# Patient Record
Sex: MALE | Race: WHITE | NOT HISPANIC OR LATINO | Employment: STUDENT | URBAN - METROPOLITAN AREA
[De-identification: names, ages, dates, MRNs, and addresses within clinical notes are randomized per-mention and may not be internally consistent; named-entity substitution may affect disease eponyms.]

---
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
## 2023-01-04 ENCOUNTER — OFFICE VISIT (OUTPATIENT)
Dept: PEDIATRIC CARDIOLOGY | Facility: CLINIC | Age: 18
End: 2023-01-04

## 2023-01-04 VITALS
HEART RATE: 63 BPM | HEIGHT: 68 IN | OXYGEN SATURATION: 98 % | BODY MASS INDEX: 19.13 KG/M2 | WEIGHT: 126.2 LBS | SYSTOLIC BLOOD PRESSURE: 105 MMHG | DIASTOLIC BLOOD PRESSURE: 75 MMHG

## 2023-01-04 DIAGNOSIS — I49.3 PREMATURE VENTRICULAR CONTRACTIONS: Primary | ICD-10-CM

## 2023-01-04 DIAGNOSIS — I49.3 PVC'S (PREMATURE VENTRICULAR CONTRACTIONS): Primary | ICD-10-CM

## 2023-01-04 NOTE — PROGRESS NOTES
2023    Referring provider: Guru Eisenberg*      Dear Nola Munson PA-C,    I had the pleasure of seeing your patient, Mason Nuñez, in the [facility] on 2023  As you know, he is a 21 y o  male who was seen today and accompanied by mom  HPI:   Nette Fuentes is a 38-year-old male who presents for follow-up of premature ventricular complexes  He was last seen in our office by Dr Luciano Naylor in 2021  He is maintained on Toprol 25 mg daily  He reports for the most part he remembers to take his medication  He does have intermittent awareness of palpitations but they are brief and not sustained  In the last few weeks, he had a few episodes of chest pain and maybe a more "forceful palpation"  lasting 1-2 seconds in duration, when going from a laying to standing position  This occurred at rest only  Never occurred amidst activity  No associated shortness of breath, dizziness, near-syncope or syncope  He is not exercising routinely  He discontinued nicotine but continues to vape and use recreational marijuana  He denies any interval changes to his medical history  PMH:  Birth history was unremarkable  No hospitalizations  No surgeries  FAMILY HISTORY:   3 male members on biological moms side  due to "heart problems" (2 were maternal uncles  in 45s and 46s  And Maternal grandfather in his 62s )  Question of substance abuse  One had an implanted cardiac device  Details not clear  There is no family history of congenital heart disease, hypertrophic cardiomyopathy, congenital deafness  SOCIAL HISTORY:   Working doing [employer]     Leaving for college in a few weeks - studying horticulture in [facility]:   Toprol 25 mg daily       Allergies   Allergen Reactions   • Other Allergic Rhinitis     Seasonal        Review of Systems   Constitutional: Negative for activity change, appetite change, chills, diaphoresis, fatigue, fever and unexpected weight change  HENT: Negative for nosebleeds  Respiratory: Negative for cough, chest tightness, shortness of breath, wheezing and stridor  Cardiovascular: Negative for chest pain, palpitations and leg swelling  Gastrointestinal: Negative for nausea and vomiting  Endocrine: Negative for cold intolerance and heat intolerance  Musculoskeletal: Negative for arthralgias, joint swelling and myalgias  Skin: Negative for color change, pallor and rash  Neurological: Negative for dizziness, syncope, speech difficulty, weakness, light-headedness, numbness and headaches  Hematological: Negative for adenopathy  Does not bruise/bleed easily  Psychiatric/Behavioral: Negative for behavioral problems  The patient is not nervous/anxious  PHYSICAL EXAMINATION:     Vitals:    01/04/23 0953   BP: 105/75   Pulse: 63   SpO2: 98%   Weight: 57 2 kg (126 lb 3 2 oz)   Height: 5' 7 5" (1 715 m)       General:  Well - developed well-nourished and in no acute distress; acyanotic and non- dysmorphic  HEENT: Exam is benign  PERRL, MMM  Lungs: non labored, no retractions, lungs clear to auscultation in all fields with no wheezes, rales or rhonchi  Cardiovascular:  Normal PMI  RRR  There is a normal first heart sound and the second heart sound is physiologically split  No murmurs are appreciated  There are no significant clicks,  rubs or gallops noted  Abdomen: soft, non-tender and non-distended with no organomegaly  Extremities: Warm and well perfused  Pulses are 2+ in upper and lower extremities with no disparity  There is  no brachiofemoral delay  There is no cyanosis, clubbing or edema  Skin: no rashes noted  Neuro: alert and appropriate    EKG:  EKG demonstrates a normal sinus rhythm with sinus arrhythmia with a short NH interval at a rate of 61 bpm   There was one PVC  The QTc was 448 msec      Echocardiogram:  •  Normal cardiac anatomy and function  Holter:  Ordered today    ASSESSMENT/PLAN:   Iris Harden is a 42-year-old male with a structurally normal heart and a history of short runs of nonsustained ventricular tachycardia and isolated PVCs  His symptoms are well controlled on low-dose Toprol 25 mg daily  We discussed utilizing a pillbox for compliance  His previous Holter revealed monomorphic premature ventricular complexes in singles:  11 3% in 11/2020, 10% in 8/2021  A follow-up Holter monitor was placed today  Will continue Toprol 25 mg for now as its well tolerated  We did perform a follow-up screening echocardiogram which continues to demonstrate a structurally normal heart with normal biventricular size and functioning  He had a reassuring stress test in 8/2020, with frequent PVCs at the beginning of the study, that were completely suppressed during higher heart rates  Given his family history of premature cardiac deaths and unknown details, we discussed the use of genetic testing  He is encouraged to eat a heart healthy diet, exercise regularly, and remain well hydrated  Discussed importance of avoiding nicotine, excessive caffeine, and recreational drugs  I will order a fasting lipid panel for cardiac completeness  SBE Prophylaxis is NOT required for this patient  Iris Harden should have a follow up visit  - pending Holter results/ and recommend transition to adult EP  Thank you for allowing me to [facility]  If I can be of assistance in any way please feel free to contact me through the office  Brice Palmer PA-C  Pediatric Cardiology  [email or web address]  [phone]    Portions of the record may have been created with voice recognition software  Occasional wrong word or "sound a like" substitutions may have occurred due to the inherent limitations of voice recognition software    Read the chart carefully and recognize, using context, where substitutions have occurred

## 2023-01-18 ENCOUNTER — CLINICAL SUPPORT (OUTPATIENT)
Dept: PEDIATRIC CARDIOLOGY | Facility: CLINIC | Age: 18
End: 2023-01-18

## 2023-01-18 DIAGNOSIS — I49.3 PREMATURE VENTRICULAR CONTRACTIONS: ICD-10-CM

## 2023-01-24 DIAGNOSIS — I49.3 PREMATURE VENTRICULAR CONTRACTIONS: ICD-10-CM

## 2023-01-24 RX ORDER — METOPROLOL SUCCINATE 25 MG/1
25 TABLET, EXTENDED RELEASE ORAL DAILY
Qty: 90 TABLET | Refills: 3 | Status: CANCELLED | OUTPATIENT
Start: 2023-01-24

## 2023-01-24 NOTE — TELEPHONE ENCOUNTER
Mom LM on refill request line   Patient is in need of metoprolol succinate  [facility]   Mom stating it is a 80 supply

## 2023-01-27 ENCOUNTER — TELEPHONE (OUTPATIENT)
Dept: PEDIATRIC CARDIOLOGY | Facility: CLINIC | Age: 18
End: 2023-01-27

## 2023-01-27 NOTE — TELEPHONE ENCOUNTER
Contacted patients pharmacy at the advice of Flynn Willard PA-C  A verbal order for  metoprolol succinate (TOPROL-XL) 25 mg 24 hr tablet [[id number]]     Take 1 tablet (25 mg total) by mouth daily  quantity 90  Refill one  Was called into patients pharmacy listed in patients chart  Spoke with Charu Guerrier the pharmacist     pharmacy to notify parent when medication is ready to be picked up

## 2023-03-14 ENCOUNTER — TELEPHONE (OUTPATIENT)
Dept: NEPHROLOGY | Facility: CLINIC | Age: 18
End: 2023-03-14

## 2023-03-14 NOTE — TELEPHONE ENCOUNTER
Mom called in stating she received a bill in the mail for genetic testing Tyshawn Oneill got done for cardiology and she has some questions  Asking for a call back [phone]

## 2023-03-14 NOTE — TELEPHONE ENCOUNTER
Contacted parent and advised to call [phone] [facility] to discuss billing matter  A copy of patients original [facility] requisition form  for genetic testing dated 1/4/23 was emailed to parent at parents request     Above emailed to [email or web address]    Parent to contact above for clarification

## 2023-04-24 DIAGNOSIS — I49.3 PREMATURE VENTRICULAR CONTRACTIONS: ICD-10-CM

## 2023-04-26 RX ORDER — METOPROLOL SUCCINATE 25 MG/1
TABLET, EXTENDED RELEASE ORAL
Qty: 90 TABLET | Refills: 1 | Status: SHIPPED | OUTPATIENT
Start: 2023-04-26

## 2023-05-11 NOTE — PROGRESS NOTES
Update received from Fatimah Rajan (Behavioral Specialist at [facility])  Fatimah Rajan spoke with patient's mom Megan regarding [facility] and alternate partial program at [facility]  At this time, Irving plans for patient to continue with [facility] and will self-pay as he had a good experience yesterday  Fatimah Rajan also encouraged Irving to discuss outpatient treatment services with the case management team at [facility] upon patient's discharge, as they should assist with this transition  Megan understanding  OP[facility] SW outreached Megan to follow-up as well  Discussed above information  Megan confirmed patient will remain at [facility] at this time and she will coordinate with the program case management team for discharge services  Megan to Ronaldo Mendoza or NAVARRO JULIAN if additional assistance is needed  Megan expressed appreciation for follow-up  OPCM SW to close case at this time but will remain available to assist with any future needs  Update routed to Jt Howard  Initial (On Arrival)

## 2023-06-13 DIAGNOSIS — B00.1 RECURRENT COLD SORES: ICD-10-CM

## 2023-06-13 RX ORDER — VALACYCLOVIR HYDROCHLORIDE 1 G/1
2000 TABLET, FILM COATED ORAL 2 TIMES DAILY
Qty: 12 TABLET | Refills: 1 | Status: SHIPPED | OUTPATIENT
Start: 2023-06-13 | End: 2023-06-16

## 2023-11-13 ENCOUNTER — TELEPHONE (OUTPATIENT)
Dept: PEDIATRIC CARDIOLOGY | Facility: CLINIC | Age: 18
End: 2023-11-13

## 2023-11-13 DIAGNOSIS — I49.3 PREMATURE VENTRICULAR CONTRACTIONS: Primary | ICD-10-CM

## 2023-11-13 DIAGNOSIS — I49.3 PREMATURE VENTRICULAR CONTRACTIONS: ICD-10-CM

## 2023-11-13 DIAGNOSIS — I47.20 VENTRICULAR TACHYCARDIA (HCC): ICD-10-CM

## 2023-11-13 RX ORDER — METOPROLOL SUCCINATE 25 MG/1
25 TABLET, EXTENDED RELEASE ORAL DAILY
Qty: 90 TABLET | Refills: 0 | Status: SHIPPED | OUTPATIENT
Start: 2023-11-13

## 2023-11-13 NOTE — TELEPHONE ENCOUNTER
Manuel Castro from patients PCP office calling stating patient is in need of a refill of medication. Patient was seen pediatric cardiology office 1/4/2023. Patient was instructed at that time he is to transfer to adult cardiology. Pcp office grateful for above information will inform patients parent.

## 2023-11-13 NOTE — TELEPHONE ENCOUNTER
Spoke with Adry Warren, the reason they haven't been refilling the medication is because they have not received any voicemail or messages from her requesting a refill. Also during the last visit they were told Marquis Oliver has to find a Cardiologist for adults and not peds.

## 2023-11-13 NOTE — TELEPHONE ENCOUNTER
John Paul Bishop called due to her son needing a medication refill. She has called Cardio since Oct 31 for refill. Has not gotten a call back nor a script sent. She was wondering if you could refill Metoprolol.

## 2024-01-08 ENCOUNTER — OFFICE VISIT (OUTPATIENT)
Dept: CARDIOLOGY CLINIC | Facility: CLINIC | Age: 19
End: 2024-01-08
Payer: COMMERCIAL

## 2024-01-08 VITALS
OXYGEN SATURATION: 98 % | HEART RATE: 57 BPM | DIASTOLIC BLOOD PRESSURE: 60 MMHG | WEIGHT: 121 LBS | SYSTOLIC BLOOD PRESSURE: 102 MMHG | BODY MASS INDEX: 18.67 KG/M2

## 2024-01-08 DIAGNOSIS — I49.3 PREMATURE VENTRICULAR CONTRACTIONS: ICD-10-CM

## 2024-01-08 DIAGNOSIS — I47.20 VENTRICULAR TACHYCARDIA (HCC): Primary | ICD-10-CM

## 2024-01-08 DIAGNOSIS — Z13.220 SCREENING FOR LIPID DISORDERS: ICD-10-CM

## 2024-01-08 PROCEDURE — 93000 ELECTROCARDIOGRAM COMPLETE: CPT | Performed by: INTERNAL MEDICINE

## 2024-01-08 PROCEDURE — 99204 OFFICE O/P NEW MOD 45 MIN: CPT | Performed by: INTERNAL MEDICINE

## 2024-01-08 RX ORDER — METOPROLOL SUCCINATE 25 MG/1
25 TABLET, EXTENDED RELEASE ORAL DAILY
Qty: 90 TABLET | Refills: 3 | Status: SHIPPED | OUTPATIENT
Start: 2024-01-08

## 2024-01-08 NOTE — PROGRESS NOTES
Cardiology     Clinic Visit Note  Sanford Nixon 18 y.o. male   MRN: [id number]    Assessment and Plan      Diagnoses and all orders for this visit:    Ventricular tachycardia (HCC) - Previous history of NSVT. Has been on metoprolol for many years and repeat zio patches have not demonstrated NSVT. He does have a moderate PVC burden but most recently less than 10%. His last echocardiogram had normal EF and no significant structural abnormalities. 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 He will cont. Metoprolol at this time. Would limit substances that can be possible nidus including nicotine and THC.   -     POCT ECG  -     MRI cardiac  w wo contrast; Future    2. Premature ventricular contractions - As above.   -     POCT ECG  -     MRI cardiac  w wo contrast; Future  -     metoprolol succinate (TOPROL-XL) 25 mg 24 hr tablet; Take 1 tablet (25 mg total) by mouth daily    3. Screening for lipid disorders  -     Lipid Panel With Direct LDL; Future    Schedule a follow-up appointment in 3 months.     Chief Complaint: PVCs/NSVT.   Subjective     History of Present Illness:  18 year old male with PMH of PVC and VT. He previously saw Dr. Reyes with pediatric cardiology. He has been started on metoprolol for his PVC's. He last had an echocardiogram performed January 2023 which was notable for normal LVEF and no significant valvular abnormalities. His last zio patch was Jan 2023 and he was noted to have 8.7% pvc burden. No NSVT at that time. He had a stress test 8/2020 that was normal other than PVC which were suppressed during stage 4 of exercise. His NSVT was noted in 2020 which he had a run of 6 beats.     He reports that he does feel lightheaded at times - but not too frequently and seems to happen at random times and is more like a dissociative feeling. He does not endorse any chest pain. He reports he does not drink much alcohol. He does smoke  marijuana and previously has been a heavy smoker. He has a mild amount of caffeine intake (less than 100 mg daily). He reports shortness of breath only when he smokes marijuana - otherwise he does not endorse any heart failure symptoms. No leg swelling. No orthopnea. Occasional rare palpitations - but does not seem to be overtly symptomatic from his PVC's he reports.     Family history - significant for grandparents with myocardial infarctions in the 50s. No single car accidents - drownings - no SCD at a young age.     Previous Cardiology Workup:  TREADMILL STRESS  Results for orders placed during the hospital encounter of 20    Stress test only, exercise    Narrative  [place] [id number] [phone]    Stress Electrocardiography during Exercise    Name: STELLA REINOSO  MR #: [id number]  Account #: [id number]  Study date: 2020  : 2005  Age: 15 years  Gender: Male  Height: 67 in  Weight: 134 lb  BSA: 1.71 mï¾²    Allergies: NO KNOWN ALLERGIES    Diagnosis: 785.1 - PALPITATIONS    Primary Physician:  Yulissa SORENSON  Referring Physician:  DEBBIE CHENG  Group:  [facility]  Cardiology Fellow:  Xena Villegas MD  Report Prepared By::  Raven Palmer  Interpreting Physician:  Wiley Gloria MD  Technician:  Raven Palmer    CLINICAL QUESTION: Ventricular arrhythmia.    HISTORY: The patient is a 15 year old  male. Chest pain status: no chest pain. Other symptoms: palpitations. Coronary artery disease risk factors: smoking usage of E-cig daily and family history of premature coronary artery  disease. Medications: a beta blocker.    REST ECG: Normal sinus rhythm. The ECG showed frequent monomorphic premature ventricular contractions possibly originating from the right ventricular outflow tract.    PROCEDURE: Treadmill exercise testing was performed, using the Raji protocol. Stress  electrocardiographic evaluation was performed.    ELIZ PROTOCOL:  HR bpm SBP mmHg DBP mmHg Symptoms  Baseline 55 104 72 none  Stage 1 83 102 68 none  Stage 2 100 110 70 none  Stage 3 126 132 72 fatigue  Stage 4 150 -- -- mild dyspnea, fatigue  Recovery 1 150 148 70 same as above  Recovery 2 75 112 72 subsiding  Recovery 3 65 116 68 none  resting o2 sat 98% peak stress o2 sat 99% No medications or fluids given.    STRESS SUMMARY: Duration of exercise was 12 min and 0 sec. The patient exercised to protocol stage 4. Functional capacity was normal. Maximal heart rate during stress was 150 bpm ( 74 % of maximal predicted heart rate). The heart rate  response to stress was blunted likely due to beta-blocker therapy. There was normal resting blood pressure with an appropriate response to stress. The rate-pressure product for the peak heart rate and blood pressure was 37699. There was no  chest pain during stress. The stress test was terminated due to achievement of maximal (symptom limited) exercise and protocol completion. Arrhythmia during stress: isolated premature ventricular beats.  PVCs were monomorphic with similar morphology to that of baseline EKG with some fusion beats. PVCs were completely suppressed during stage 4 of the exercise protocol.    SUMMARY:  -  Stress results: Duration of exercise was 12 min and 0 sec. Functional capacity was normal. Target heart rate was not achieved. There was no chest pain during stress.  -  Impressions and recommendations: Patient had frequent PVCs at the beginning of the test which were suppressed during the stage 4 of the exercise.    IMPRESSIONS: Patient had frequent PVCs at the beginning of the test which were suppressed during the stage 4 of the exercise. Equivocal study after submaximal exercise. Diagnostic sensitivity was limited by submaximal stress.    Prepared and signed by    Wiley Gloria MD  Signed 08/31/2020 15:35:43      ----------------------------------------------------------------------------------------------  NUCLEAR STRESS TEST: No results found for this or any previous visit.    No results found for this or any previous visit.      --------------------------------------------------------------------------------  CATH:  No results found for this or any previous visit.    --------------------------------------------------------------------------------  ECHO:   No results found for this or any previous visit.    No results found for this or any previous visit.    --------------------------------------------------------------------------------  HOLTER  No results found for this or any previous visit.    --------------------------------------------------------------------------------  CAROTIDS  No results found for this or any previous visit.       ---------------------------------------------------------------------------------  Review of Systems   Constitutional:  Negative for activity change.   Cardiovascular:  Positive for palpitations. Negative for chest pain and leg swelling.   Skin:  Negative for rash.   Neurological:  Negative for syncope.   Psychiatric/Behavioral:  Negative for agitation.          Current Outpatient Medications:     metoprolol succinate (TOPROL-XL) 25 mg 24 hr tablet, Take 1 tablet (25 mg total) by mouth daily, Disp: 90 tablet, Rfl: 0    valACYclovir (VALTREX) 1,000 mg tablet, Take 2 tablets (2,000 mg total) by mouth 2 (two) times a day for 3 days, Disp: 12 tablet, Rfl: 1  Past Medical History:   Diagnosis Date    Anxiety     Depression     PVC's (premature ventricular contractions)      No past surgical history on file.  Social History     Socioeconomic History    Marital status: Single     Spouse name: Not on file    Number of children: Not on file    Years of education: Not on file    Highest education level: Not on file   Occupational History    Not on file   Tobacco Use    Smoking status: Never     Smokeless tobacco: Current    Tobacco comments:     Vaping   Vaping Use    Vaping status: Every Day   Substance and Sexual Activity    Alcohol use: Yes     Comment: Rarely    Drug use: Yes     Types: Marijuana    Sexual activity: Yes     Partners: Female     Birth control/protection: Condom Male   Other Topics Concern    Not on file   Social History Narrative    Not on file     Social Determinants of Health     Financial Resource Strain: Not on file   Food Insecurity: Not on file   Transportation Needs: Not on file   Physical Activity: Not on file   Stress: Not on file   Social Connections: Not on file   Intimate Partner Violence: Not on file   Housing Stability: Not on file     Family History   Problem Relation Age of Onset    Alcohol abuse Mother     Substance Abuse Mother     No Known Problems Father     Alcohol abuse Maternal Aunt     Substance Abuse Maternal Aunt     Alcohol abuse Maternal Uncle     Substance Abuse Maternal Uncle     Alcohol abuse Cousin     Substance Abuse Cousin     Mental illness Neg Hx      Allergies   Allergen Reactions    Other Allergic Rhinitis     Seasonal        Objective     Vitals:    01/08/24 0903   BP: 102/60   BP Location: Right arm   Patient Position: Sitting   Cuff Size: Standard   Pulse: 57   SpO2: 98%   Weight: 54.9 kg (121 lb)       Physical exam:     Physical Exam  Constitutional:       Appearance: Normal appearance.   Cardiovascular:      Rate and Rhythm: Normal rate. Rhythm irregular.   Pulmonary:      Effort: Pulmonary effort is normal. No respiratory distress.      Breath sounds: Normal breath sounds.   Abdominal:      General: There is no distension.   Musculoskeletal:      Right lower leg: No edema.      Left lower leg: No edema.   Neurological:      Mental Status: He is alert.           ==  PLEASE NOTE:  This encounter was completed utilizing the [employer]/Fluency Direct Speech Voice Recognition Software. Grammatical errors, random word insertions, pronoun errors and  incomplete sentences are occasional consequences of the system due to software limitations, ambient noise and hardware issues.These may be missed by proof reading prior to affixing electronic signature. Any questions or concerns about the content, text or information contained within the body of this dictation should be directly addressed to the physician for clarification. Please do not hesitate to call me directly if you have any any questions or concerns.

## 2024-03-14 ENCOUNTER — HOSPITAL ENCOUNTER (OUTPATIENT)
Dept: MRI IMAGING | Facility: HOSPITAL | Age: 19
Discharge: HOME | End: 2024-03-14
Payer: COMMERCIAL

## 2024-03-14 DIAGNOSIS — I47.20 VENTRICULAR TACHYCARDIA (HCC): ICD-10-CM

## 2024-03-14 DIAGNOSIS — I49.3 PREMATURE VENTRICULAR CONTRACTIONS: ICD-10-CM

## 2024-03-14 PROCEDURE — 75561 CARDIAC MRI FOR MORPH W/DYE: CPT

## 2024-03-14 PROCEDURE — G1004 CDSM NDSC: HCPCS

## 2024-03-14 PROCEDURE — A9585 GADOBUTROL INJECTION: HCPCS | Performed by: INTERNAL MEDICINE

## 2024-03-14 RX ORDER — GADOBUTROL 604.72 MG/ML
10 INJECTION INTRAVENOUS
Status: COMPLETED | OUTPATIENT
Start: 2024-03-14 | End: 2024-03-14

## 2024-03-14 RX ADMIN — GADOBUTROL 10 ML: 604.72 INJECTION INTRAVENOUS at 12:52

## 2024-03-26 ENCOUNTER — TELEPHONE (OUTPATIENT)
Dept: CARDIOLOGY CLINIC | Facility: CLINIC | Age: 19
End: 2024-03-26

## 2024-03-26 NOTE — TELEPHONE ENCOUNTER
Melinda called to go over and get results of Sanford's cardiac MRI, Mom is documented on the hIPAA form.     Review results with Mom per Dr Hernandez, DO  Normal cardiac MRI. Mom verbally understood

## 2024-06-13 ENCOUNTER — OFFICE VISIT (OUTPATIENT)
Dept: FAMILY MEDICINE CLINIC | Facility: CLINIC | Age: 19
End: 2024-06-13
Payer: COMMERCIAL

## 2024-06-13 VITALS
WEIGHT: 113 LBS | BODY MASS INDEX: 17.13 KG/M2 | SYSTOLIC BLOOD PRESSURE: 102 MMHG | HEART RATE: 68 BPM | HEIGHT: 68 IN | TEMPERATURE: 98.4 F | DIASTOLIC BLOOD PRESSURE: 76 MMHG | OXYGEN SATURATION: 98 % | RESPIRATION RATE: 16 BRPM

## 2024-06-13 DIAGNOSIS — E46 PROTEIN-CALORIE MALNUTRITION, UNSPECIFIED SEVERITY (HCC): ICD-10-CM

## 2024-06-13 DIAGNOSIS — F33.9 DEPRESSION, RECURRENT (HCC): Primary | ICD-10-CM

## 2024-06-13 PROCEDURE — 99213 OFFICE O/P EST LOW 20 MIN: CPT | Performed by: PHYSICIAN ASSISTANT

## 2024-06-13 NOTE — PROGRESS NOTES
Assessment/Plan:    MDD - refer to psych, continue with therapist, medicinal marijuana  Low BMI - encourage healthy lifestyle    F/u as needed    Subjective:   Chief Complaint   Patient presents with    Insomnia     Difficulty falling and staying asleep  Ongoing issue    Loss of Appetite     Ongoing issue  No n/v abdominal pain  Inconsistent eating patterns      Patient ID: Sanford Nixon is a 19 y.o. male.    Patient here complaining of trouble sleeping, not eating well.  Feels this has been long term problems, mother told him to come today to discuss medications. Patient follows with therapist and feels they have had some productive conversations about patient being on the spectrum possibly. Woul dlike to pursue this.    Currently only using medical marijuana. Refusing to take zoloft again. Does not want to put anything in his body.          The following portions of the patient's history were reviewed and updated as appropriate: allergies, current medications, past family history, past medical history, past social history, past surgical history, and problem list.    Past Medical History:   Diagnosis Date    Anxiety     Depression     PVC's (premature ventricular contractions)      History reviewed. No pertinent surgical history.  Family History   Problem Relation Age of Onset    Alcohol abuse Mother     Substance Abuse Mother     No Known Problems Father     Alcohol abuse Maternal Aunt     Substance Abuse Maternal Aunt     Alcohol abuse Maternal Uncle     Substance Abuse Maternal Uncle     Alcohol abuse Cousin     Substance Abuse Cousin     Mental illness Neg Hx      Social History     Socioeconomic History    Marital status: Single     Spouse name: Not on file    Number of children: Not on file    Years of education: Not on file    Highest education level: Not on file   Occupational History    Not on file   Tobacco Use    Smoking status: Never    Smokeless tobacco: Current    Tobacco comments:     Vaping  "  Vaping Use    Vaping status: Every Day   Substance and Sexual Activity    Alcohol use: Yes     Comment: Rarely    Drug use: Yes     Types: Marijuana    Sexual activity: Yes     Partners: Female     Birth control/protection: Condom Male   Other Topics Concern    Not on file   Social History Narrative    Not on file     Social Determinants of Health     Financial Resource Strain: Not on file   Food Insecurity: Not on file   Transportation Needs: Not on file   Physical Activity: Not on file   Stress: Not on file   Social Connections: Not on file   Intimate Partner Violence: Not on file   Housing Stability: Not on file       Current Outpatient Medications:     metoprolol succinate (TOPROL-XL) 25 mg 24 hr tablet, Take 1 tablet (25 mg total) by mouth daily, Disp: 90 tablet, Rfl: 3    valACYclovir (VALTREX) 1,000 mg tablet, Take 2 tablets (2,000 mg total) by mouth 2 (two) times a day for 3 days, Disp: 12 tablet, Rfl: 1    Review of Systems          Objective:    Vitals:    06/13/24 1341   BP: 102/76   Pulse: 68   Resp: 16   Temp: 98.4 °F (36.9 °C)   TempSrc: Temporal   SpO2: 98%   Weight: 51.3 kg (113 lb)   Height: 5' 8\" (1.727 m)        Physical Exam  Constitutional:       Appearance: Normal appearance. He is normal weight.   HENT:      Head: Normocephalic and atraumatic.   Neurological:      General: No focal deficit present.      Mental Status: He is alert and oriented to person, place, and time.   Psychiatric:         Mood and Affect: Mood normal.         Behavior: Behavior normal.         Thought Content: Thought content normal.         Judgment: Judgment normal.           Depression Screening Follow-up Plan: Patient's depression screening was positive with a PHQ-2 score of . Their PHQ-9 score was 17. Patient assessed for underlying major depression. They have no active suicidal ideations. Brief counseling provided and recommend additional follow-up/re-evaluation next office visit.    "

## 2024-06-25 ENCOUNTER — TELEPHONE (OUTPATIENT)
Age: 19
End: 2024-06-25

## 2024-06-25 DIAGNOSIS — G47.00 INSOMNIA, UNSPECIFIED TYPE: Primary | ICD-10-CM

## 2024-06-25 LAB
25(OH)D3 SERPL-MCNC: 30 NG/ML (ref 30–100)
ALBUMIN SERPL-MCNC: 4.9 G/DL (ref 3.6–5.1)
ALBUMIN/GLOB SERPL: 2.6 (CALC) (ref 1–2.5)
ALP SERPL-CCNC: 57 U/L (ref 36–130)
ALT SERPL-CCNC: 14 U/L (ref 9–46)
AST SERPL-CCNC: 15 U/L (ref 10–40)
BASOPHILS # BLD AUTO: 48 CELLS/UL (ref 0–200)
BASOPHILS NFR BLD AUTO: 1 %
BILIRUB SERPL-MCNC: 0.5 MG/DL (ref 0.2–1.2)
BUN SERPL-MCNC: 11 MG/DL (ref 7–25)
BUN/CREAT SERPL: ABNORMAL (CALC) (ref 6–22)
CALCIUM SERPL-MCNC: 9.7 MG/DL (ref 8.6–10.3)
CHLORIDE SERPL-SCNC: 105 MMOL/L (ref 98–110)
CO2 SERPL-SCNC: 27 MMOL/L (ref 20–32)
CREAT SERPL-MCNC: 0.89 MG/DL (ref 0.6–1.24)
EOSINOPHIL # BLD AUTO: 91 CELLS/UL (ref 15–500)
EOSINOPHIL NFR BLD AUTO: 1.9 %
ERYTHROCYTE [DISTWIDTH] IN BLOOD BY AUTOMATED COUNT: 13.5 % (ref 11–15)
GFR/BSA.PRED SERPLBLD CYS-BASED-ARV: 125 ML/MIN/1.73M2
GLOBULIN SER CALC-MCNC: 1.9 G/DL (CALC) (ref 1.9–3.7)
GLUCOSE SERPL-MCNC: 90 MG/DL (ref 65–99)
HCT VFR BLD AUTO: 42.7 % (ref 38.5–50)
HGB BLD-MCNC: 14.2 G/DL (ref 13.2–17.1)
LYMPHOCYTES # BLD AUTO: 1920 CELLS/UL (ref 850–3900)
LYMPHOCYTES NFR BLD AUTO: 40 %
MCH RBC QN AUTO: 27 PG (ref 27–33)
MCHC RBC AUTO-ENTMCNC: 33.3 G/DL (ref 32–36)
MCV RBC AUTO: 81.3 FL (ref 80–100)
MONOCYTES # BLD AUTO: 269 CELLS/UL (ref 200–950)
MONOCYTES NFR BLD AUTO: 5.6 %
NEUTROPHILS # BLD AUTO: 2472 CELLS/UL (ref 1500–7800)
NEUTROPHILS NFR BLD AUTO: 51.5 %
PLATELET # BLD AUTO: 254 THOUSAND/UL (ref 140–400)
PMV BLD REES-ECKER: 11.7 FL (ref 7.5–12.5)
POTASSIUM SERPL-SCNC: 4.2 MMOL/L (ref 3.5–5.3)
PROT SERPL-MCNC: 6.8 G/DL (ref 6.1–8.1)
RBC # BLD AUTO: 5.25 MILLION/UL (ref 4.2–5.8)
SODIUM SERPL-SCNC: 140 MMOL/L (ref 135–146)
TSH SERPL-ACNC: 0.77 MIU/L (ref 0.4–4.5)
WBC # BLD AUTO: 4.8 THOUSAND/UL (ref 3.8–10.8)

## 2024-06-25 NOTE — TELEPHONE ENCOUNTER
Pt called in stating that  he received a msg that, per Yulissa, he should cancel his appt today and make an appt for a sleep study. He is requesting a sleep study order be placed in his chart. I gave him the number for central scheduling as well.    Please advise pt once order has been placed.    Thanks

## 2024-06-26 ENCOUNTER — OFFICE VISIT (OUTPATIENT)
Dept: SLEEP CENTER | Facility: CLINIC | Age: 19
End: 2024-06-26
Payer: COMMERCIAL

## 2024-06-26 ENCOUNTER — TELEPHONE (OUTPATIENT)
Age: 19
End: 2024-06-26

## 2024-06-26 VITALS
HEIGHT: 68 IN | DIASTOLIC BLOOD PRESSURE: 62 MMHG | WEIGHT: 111 LBS | BODY MASS INDEX: 16.82 KG/M2 | SYSTOLIC BLOOD PRESSURE: 98 MMHG | OXYGEN SATURATION: 97 % | HEART RATE: 48 BPM

## 2024-06-26 DIAGNOSIS — Z79.899 MEDICAL MARIJUANA USE: ICD-10-CM

## 2024-06-26 DIAGNOSIS — R63.4 WEIGHT LOSS, NON-INTENTIONAL: ICD-10-CM

## 2024-06-26 DIAGNOSIS — G47.00 INSOMNIA, UNSPECIFIED TYPE: Primary | ICD-10-CM

## 2024-06-26 DIAGNOSIS — I49.3 PREMATURE VENTRICULAR CONTRACTIONS: ICD-10-CM

## 2024-06-26 DIAGNOSIS — F32.A ANXIETY AND DEPRESSION: ICD-10-CM

## 2024-06-26 DIAGNOSIS — E46 PROTEIN-CALORIE MALNUTRITION, UNSPECIFIED SEVERITY (HCC): ICD-10-CM

## 2024-06-26 DIAGNOSIS — F41.9 ANXIETY AND DEPRESSION: ICD-10-CM

## 2024-06-26 PROCEDURE — 99244 OFF/OP CNSLTJ NEW/EST MOD 40: CPT | Performed by: INTERNAL MEDICINE

## 2024-06-26 NOTE — TELEPHONE ENCOUNTER
Patient is requesting an insurance referral for the following specialty:      Test Name / Order Name: Consult     DX Code: G47.00    Date Of Service: 06/26/2024    Location/Facility Name/Address/Phone #:   [facility] [place], [address] [place] [address], [address], [place], [place], [address]     Location / Facility NPI: [id number]    [facility] Phone # To Reach The Patient: [phone]    [facility] [phone]

## 2024-06-26 NOTE — PATIENT INSTRUCTIONS
What you can do to improve your sleep: (Sleep Hygiene) Basic rules for a good night's sleep  Create a regular sleep schedule. This will help you form a sleep routine. Keep a record of your sleep patterns, and any sleeping problems you have. Bring the record to follow-up visits with healthcare providers.  Avoid prolonged use of light-emitting screens before bedtime or watching TV in bed.  Avoid forcing sleep.  Do not take naps. Naps could make it hard for you to fall asleep at bedtime.  Deal with your worries before bedtime.  Keep your bedroom cool, quiet, and dark. Turn on white noise, such as a fan, to help you relax. Do not use your bed for any activity that will keep you awake. Do not read, exercise, eat, or watch TV in your bedroom.  Get up if you do not fall asleep within 20 minutes. Move to another room and do something relaxing until you become sleepy.  Limit caffeine, alcohol, nicotine and food to earlier in the day. Only drink caffeine in the morning. Do not drink alcohol within 6 hours of bedtime. Do not eat a heavy meal right before you go to bed. Avoid smoking, especially in the evening.  Exercise regularly. Daily exercise will help you sleep better. Do not exercise within 4 hours of bedtime.  Stimulus control therapy rules  1. Go to bed only when sleepy.  2. Do not watch television, read, eat, or worry while in bed. Use bed only for sleep and sex.  3. Get out of bed if unable to fall asleep within 20 minutes and go to another room. Return to bed only when sleepy. Repeat this step as many times as necessary throughout the night.  4. Set an alarm clock to wake up at a fixed time each morning, including weekends.  5. Do not take a nap during the day.  Data from: Olga RR, Shaylee ML. Nonpharmacologic treatments of insomnia. J Clin Psychiatry 1992; 53:37.  Go to AASM website for more information: Sleepeducation.org  Recommended Reading: Book by delano Worley   No More sleepless nights      Nursing  Support:  When: Monday through Friday 7A-5PM except holidays  Where: Our direct line is [phone].    If you are having a true emergency please call 911.  In the event that the line is busy or it is after hours please leave a voice message and we will return your call.  Please speak clearly, leaving your full name, birth date, best number to reach you and the reason for your call.   Medication refills: We will need the name of the medication, the dosage, the ordering provider, whether you get a 30 or 90 day refill, and the pharmacy name and address.  Medications will be ordered by the provider only.  Nurses cannot call in prescriptions.  Please allow 7 days for medication refills.  Physician requested updates: If your provider requested that you call with an update after starting medication, please be ready to provide us the medication and dosage, what time you take your medication, the time you attempt to fall asleep, time you fall asleep, when you wake up, and what time you get out of bed.  Sleep Study Results: We will contact you with sleep study results and/or next steps after the physician has reviewed your testing.

## 2024-06-26 NOTE — PROGRESS NOTES
Consultation - Sleep Center   Sanford Nixon  19 y.o. male  :2005  MRN:[id number]  DOS:2024    Physician Requesting Consult: Yulissa Sarah P*             Reason for Consult : At your kind request I saw Sanford Nixon for initial sleep evaluation today. Patient is here for a complaint of insomnia  Patient's presents with:.    PFSH, Problem List, Medications & Allergies were reviewed in EMR.    Sanford  has a past medical history of Anxiety, Depression, and PVC's (premature ventricular contractions).      He has a current medication list which includes the following prescription(s): metoprolol succinate and valacyclovir.      HPI: Has been experiencing sleep difficulties for several years and is now here because symptoms have gotten worse.  Sanford sleeps alone and is is un aware of snoring or breathing difficulties during sleep  Other complaints: None. Restless Leg Syndrome: reports no suggestive symptoms.  .  Parasomnia: no features reported    Sleep Routine (averaged): Typical Bedtime: 1 to 4 AM   Gets OOB: 10 to 11 AM. TIB:>9 hrs.   Sleep latency: several hours because of racing thoughts.  He is on his phone when in bed.; Sleep Interruptions: 1-, not sure of the cause, but is anxious and struggles to fall back asleep. Awakens: Spontaneously  Upon awakening: never feels rested.  He estimates getting 5-6hrs sleep.  Daytime Function:Sanford reports excessive daytime sleepiness feels like napping but avoids. He rated himself at Total score: 12 /24 on the [place] Sleepiness Scale.     Habits:   reports that he has never smoked. He uses smokeless tobacco.;  reports current alcohol use.; Reports current drug use. Drug: Marijuana.;  E-Cigarette/Vaping  E-Cigarette Use Current Every Day User  Comments Vape; Caffeine use:limited; Exercise routine: none.    Occupation:     Family History: Negative for sleep disturbance.  ROS: Significant for recent weight loss because of poor  "appetite..  He reports some nasal congestion.  He reported no cardiac symptoms.  He has episodic palpitations.  He reports feelings of anxiety and depression    EXAM:  BP 98/62   Pulse (!) 48   Ht 5' 8\" (1.727 m)   Wt 50.3 kg (111 lb)   SpO2 97%   BMI 16.88 kg/m²    General: Well groomed male, well appearing, in no apparent distress.   Neurological: Alert and orientated; cooperative; Cranial nerves intact;    Psychiatric: Speech: Clear and coherent; normal mood, affect & thought   Skin: Warm and dry; Color& Hydration good; no facial rashes or lesions   HEENT:  Craniofacial anatomy: narrow facies Sinuses: Non-tender. TMJ: Normal    Eyes: EOM's intact; conjunctiva/corneas clear   Ears: Appear normal     Nasal Airway: is patent Septum: Intact; Turbinates: Normal; Rhinorrhea: None  Mouth: Lips: Normal posture; Dentition: normal . Mucosa: Moist; Hard Palate:normal    Oropharryx: crowdedTongue: Mallampati:Class IV and MobileSoft Palate:  redundant  and Uvular Hypertrophy Tonsils: absent  Neck:; Neck Circumference: 12.5 \"; supple; no abnormal masses; Thyroid: Normal. Trachea: Central.    Lymph: No cervical Lymhadenopathy  Heart: S1,S2 normal; RRR; no gallop; no murmur   Lungs: Respiratory Effort: Normal. Air entry good bilaterally.  No wheezes.  No rales  Abdomen:  Soft & non-tender    Extremities: No pedal edema.  No clubbing or cyanosis.    Musculoskeletal:  Motor normal; Gait: Normal.       IMPRESSION: Primary/Secondary Sleep Diagnoses (to Medical or Psych conditions) & Comorbidities   1. Insomnia, unspecified type  Ambulatory Referral to Sleep Medicine      2. Anxiety and depression        3. Medical marijuana use        4. Premature ventricular contractions        5. Weight loss, non-intentional        6. Protein-calorie malnutrition, unspecified severity (HCC)             PLAN:   1. With respect to above conditions, comprehensive counseling provided on pathophysiology; effects on symptoms and comorbidities, " "diagnostic strategies & limitations; treatment options; risks or no treament; risks & benefits of the various therapeutic options; costs and insurance aspects.     2. Multi component Cognitive behavioral therapy for Insomnia undertaken - Sleep Restriction, Stimulus control, Relaxation techniques and Sleep hygiene were discussed.    3. Misconceptions, irrational and erroneous notions regarding insomnia were addressed.  4. Use of hypnotic medications was not recommended at this time because most effective and enduring results are with behavioral therapies.   5. I provided educational materials and advised reading \"No more sleepless nights \" by Authors Saul & Tanisha. In addition,   6.  If symptoms persist he should consider antidepressant medication and contact PCP or psychiatrist in this regard.    7.   He may also benefit from formal CBT-I  8. Nocturnal polysomnography is not indicated at this time and a diagnostic study can be considered once sleep has consolidated and if symptoms persist.   9. Follow-up advised in 2 to 3 months to monitor progress and to adjust therapy.  He is due to return to college in [place] and scheduled his follow-up for November when he next returns.    Sincerely,      Authenticated electronically on 06/26/24   Board Certified Specialist     Portions of the record may have been created with voice recognition software. Occasional wrong word or \"sound a like\" substitutions may have occurred due to the inherent limitations of voice recognition software. There may also be notations and random deletions of words or characters from malfunctioning software. Read the chart carefully and recognize, using context, where substitutions/deletions have occurred.     "

## 2024-07-02 NOTE — TELEPHONE ENCOUNTER
Dori from [facility] called to follow up on insurance referral for 6/26 consult.  [facility]# [id number]    Diag Code # G47.00  Please contact Dori for update on referral status.  [phone].

## 2024-09-23 ENCOUNTER — OFFICE VISIT (OUTPATIENT)
Dept: FAMILY MEDICINE CLINIC | Facility: CLINIC | Age: 19
End: 2024-09-23
Payer: COMMERCIAL

## 2024-09-23 ENCOUNTER — APPOINTMENT (OUTPATIENT)
Dept: RADIOLOGY | Facility: CLINIC | Age: 19
End: 2024-09-23
Payer: COMMERCIAL

## 2024-09-23 VITALS
OXYGEN SATURATION: 100 % | WEIGHT: 127.6 LBS | RESPIRATION RATE: 15 BRPM | HEART RATE: 54 BPM | DIASTOLIC BLOOD PRESSURE: 70 MMHG | BODY MASS INDEX: 19.34 KG/M2 | HEIGHT: 68 IN | SYSTOLIC BLOOD PRESSURE: 102 MMHG | TEMPERATURE: 98.2 F

## 2024-09-23 DIAGNOSIS — R39.9 LOWER URINARY TRACT SYMPTOMS: ICD-10-CM

## 2024-09-23 DIAGNOSIS — R39.9 LOWER URINARY TRACT SYMPTOMS: Primary | ICD-10-CM

## 2024-09-23 DIAGNOSIS — Z11.3 SCREEN FOR STD (SEXUALLY TRANSMITTED DISEASE): ICD-10-CM

## 2024-09-23 PROCEDURE — 74018 RADEX ABDOMEN 1 VIEW: CPT

## 2024-09-23 PROCEDURE — 99213 OFFICE O/P EST LOW 20 MIN: CPT | Performed by: PHYSICIAN ASSISTANT
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
## 2024-09-24 LAB
APPEARANCE UR: CLEAR
BILIRUB UR QL STRIP: NEGATIVE
C TRACH RRNA SPEC QL NAA+PROBE: NOT DETECTED
COLOR UR: YELLOW
GLUCOSE UR QL STRIP: NEGATIVE
HGB UR QL STRIP: NEGATIVE
KETONES UR QL STRIP: NEGATIVE
LEUKOCYTE ESTERASE UR QL STRIP: NEGATIVE
N GONORRHOEA RRNA SPEC QL NAA+PROBE: NOT DETECTED
NITRITE UR QL STRIP: NEGATIVE
PH UR STRIP: 6.5 [PH] (ref 5–8)
PROT UR QL STRIP: NEGATIVE
SP GR UR STRIP: 1 (ref 1–1.03)

## 2024-10-04 ENCOUNTER — TELEPHONE (OUTPATIENT)
Age: 19
End: 2024-10-04

## 2024-11-03 ENCOUNTER — OFFICE VISIT (OUTPATIENT)
Dept: URGENT CARE | Facility: CLINIC | Age: 19
End: 2024-11-03
Payer: COMMERCIAL

## 2024-11-03 VITALS
HEART RATE: 57 BPM | RESPIRATION RATE: 18 BRPM | DIASTOLIC BLOOD PRESSURE: 52 MMHG | HEIGHT: 68 IN | WEIGHT: 133 LBS | BODY MASS INDEX: 20.16 KG/M2 | OXYGEN SATURATION: 97 % | SYSTOLIC BLOOD PRESSURE: 107 MMHG | TEMPERATURE: 98.3 F

## 2024-11-03 DIAGNOSIS — J02.9 SORE THROAT: ICD-10-CM

## 2024-11-03 DIAGNOSIS — J02.9 ACUTE PHARYNGITIS, UNSPECIFIED ETIOLOGY: Primary | ICD-10-CM

## 2024-11-03 LAB — S PYO AG THROAT QL: NEGATIVE

## 2024-11-03 PROCEDURE — 87880 STREP A ASSAY W/OPTIC: CPT

## 2024-11-03 PROCEDURE — G0382 LEV 3 HOSP TYPE B ED VISIT: HCPCS

## 2024-11-03 PROCEDURE — S9083 URGENT CARE CENTER GLOBAL: HCPCS

## 2024-11-03 RX ORDER — PREDNISONE 10 MG/1
TABLET ORAL
Qty: 15 TABLET | Refills: 0 | Status: SHIPPED | OUTPATIENT
Start: 2024-11-03 | End: 2024-11-06

## 2024-11-03 NOTE — PROGRESS NOTES
[facility]        NAME: Sanford Nixon is a 19 y.o. male  : 2005    MRN: [id number]  DATE: November 3, 2024  TIME: 2:07 PM    Assessment and Plan   Acute pharyngitis, unspecified etiology [J02.9]  1. Acute pharyngitis, unspecified etiology  predniSONE 10 mg tablet      2. Sore throat  POCT rapid ANTIGEN strepA    Throat culture            Patient Instructions     Your rapid strep test was negative. No antibiotics are needed at this time.     Throat swab will be sent for definitive culture. You can download [facility] for the results which take approximately 48-72 hours. You will be notified if positive.     Your symptoms are consistent with a viral illness.    For nasal/sinus congestion you can try steam, warm compresses, saline nasal spray, Neti pot, nasal steroid (Flonase, Nasocort), or nasal decongestant (Afrin - for 3 days only).    You can try a decongestant (Sudafed) if > 6 years of age and no history of high blood pressure.    For cough you can take an over-the-counter expectorant such as plain Robitussion or Mucinex. A spoonful of honey at bedtime may also be helpful.    For sore throat you can use Cepacol lozenges, do warm salt water gargles, drink warm water with lemon or herbal teas, or use an over-the-counter throat spray (Chloraseptic).    You can take ibuprofen/Motrin and acetaminophen/Tylenol as needed for pain, fever, body aches. Do not take ibuprofen/Motrin/Advil if you have a history of heart disease, bleeding ulcers, or if you take blood thinners.     Drink plenty of fluids to stay hydrated. Airborne or Emergen-C for extra vitamin C and zinc.    Follow up with your PCP in 3-5 days for persistent symptoms.    Go to the ER if symptoms worsen.     If tests are performed, our office will contact you with results only if changes need to made to the care plan discussed with you at the visit. You can review your full results on [facility].      Chief Complaint      Chief Complaint   Patient presents with    Sore Throat     Pt states he's had a sore throat for 3 days pt took zycam, musinex  and airbone         History of Present Illness       Patient states he went to a concert three days ago and woke up the next day with a headache and sore throat.  Started taking Airborne and Zicam.  Gargling with salt water.  Feels like it is getting worse.  Also with nasal congestion, runny nose, ear discomfort, and a mild dry cough.  No fevers/chills or GI symptoms.  Requesting note for school.        Review of Systems   Review of Systems   Constitutional:  Negative for chills and fever.   HENT:  Positive for congestion, rhinorrhea and sore throat. Negative for ear pain and sinus pressure.    Eyes:  Negative for discharge and redness.   Respiratory:  Positive for cough. Negative for shortness of breath and wheezing.    Cardiovascular:  Negative for chest pain.   Gastrointestinal:  Negative for abdominal pain, diarrhea, nausea and vomiting.   Skin:  Negative for pallor and rash.   Neurological:  Positive for headaches. Negative for dizziness and light-headedness.         Current Medications       Current Outpatient Medications:     predniSONE 10 mg tablet, Take 5 tablets (50 mg total) by mouth daily for 1 day, THEN 4 tablets (40 mg total) daily for 1 day, THEN 3 tablets (30 mg total) daily for 1 day, THEN 2 tablets (20 mg total) daily for 1 day, THEN 1 tablet (10 mg total) daily for 1 day., Disp: 15 tablet, Rfl: 0    metoprolol succinate (TOPROL-XL) 25 mg 24 hr tablet, Take 1 tablet (25 mg total) by mouth daily, Disp: 90 tablet, Rfl: 3    valACYclovir (VALTREX) 1,000 mg tablet, Take 2 tablets (2,000 mg total) by mouth 2 (two) times a day for 3 days, Disp: 12 tablet, Rfl: 1    Current Allergies     Allergies as of 11/03/2024 - Reviewed 11/03/2024   Allergen Reaction Noted    Other Allergic Rhinitis 05/23/2022            The following portions of the patient's history were reviewed and  "updated as appropriate: allergies, current medications, past family history, past medical history, past social history, past surgical history and problem list.     Past Medical History:   Diagnosis Date    Anxiety     Depression     PVC's (premature ventricular contractions)        History reviewed. No pertinent surgical history.    Family History   Problem Relation Age of Onset    Alcohol abuse Mother     Substance Abuse Mother     No Known Problems Father     Alcohol abuse Maternal Aunt     Substance Abuse Maternal Aunt     Alcohol abuse Maternal Uncle     Substance Abuse Maternal Uncle     Alcohol abuse Cousin     Substance Abuse Cousin     Mental illness Neg Hx          Medications have been verified.        Objective   /52   Pulse 57   Temp 98.3 °F (36.8 °C)   Resp 18   Ht 5' 8\" (1.727 m)   Wt 60.3 kg (133 lb)   SpO2 97%   BMI 20.22 kg/m²        Physical Exam     Physical Exam  Vitals and nursing note reviewed.   Constitutional:       General: He is not in acute distress.     Appearance: He is not ill-appearing, toxic-appearing or diaphoretic.   HENT:      Head: Normocephalic and atraumatic.      Right Ear: Tympanic membrane, ear canal and external ear normal.      Left Ear: Tympanic membrane, ear canal and external ear normal.      Nose: Congestion present.      Mouth/Throat:      Mouth: Mucous membranes are moist.      Pharynx: Oropharynx is clear. Posterior oropharyngeal erythema present. No oropharyngeal exudate.   Eyes:      Conjunctiva/sclera: Conjunctivae normal.      Pupils: Pupils are equal, round, and reactive to light.   Cardiovascular:      Rate and Rhythm: Normal rate and regular rhythm.      Pulses: Normal pulses.      Heart sounds: Normal heart sounds.   Pulmonary:      Effort: Pulmonary effort is normal.      Breath sounds: Normal breath sounds.   Musculoskeletal:         General: Normal range of motion.      Cervical back: Normal range of motion and neck supple.   Lymphadenopathy: "      Cervical: Cervical adenopathy present.   Skin:     General: Skin is warm and dry.      Capillary Refill: Capillary refill takes less than 2 seconds.   Neurological:      Mental Status: He is alert and oriented to person, place, and time.

## 2024-11-03 NOTE — PATIENT INSTRUCTIONS
Your rapid strep test was negative. No antibiotics are needed at this time.     Throat swab will be sent for definitive culture. [facility] which take approximately 48-72 hours. You will be notified if positive.     Your symptoms are consistent with a viral illness.    For nasal/sinus congestion you can try steam, warm compresses, saline nasal spray, Neti pot, nasal steroid (Flonase, Nasocort), or nasal decongestant (Afrin - for 3 days only).    You can try a decongestant (Sudafed) if > 6 years of age and no history of high blood pressure.    For cough you can take an over-the-counter expectorant such as plain Robitussion or Mucinex. A spoonful of honey at bedtime may also be helpful.    For sore throat you can use Cepacol lozenges, do warm salt water gargles, drink warm water with lemon or herbal teas, or use an over-the-counter throat spray (Chloraseptic).    You can take ibuprofen/Motrin and acetaminophen/Tylenol as needed for pain, fever, body aches. Do not take ibuprofen/Motrin/Advil if you have a history of heart disease, bleeding ulcers, or if you take blood thinners.     Drink plenty of fluids to stay hydrated. Airborne or Emergen-C for extra vitamin C and zinc.    Follow up with your PCP in 3-5 days for persistent symptoms.    Go to the ER if symptoms worsen.

## 2024-11-03 NOTE — LETTER
November 3, 2024     Patient: Sanford Nixon   YOB: 2005   Date of Visit: 11/3/2024       To Whom it May Concern:    Sanford Nixon was seen in my clinic on 11/3/2024. Please allow for extra time with voice recorded assignments due to medical illness.    If you have any questions or concerns, please don't hesitate to call.         Sincerely,          MINAL Gimenez        CC: No Recipients

## 2024-11-06 ENCOUNTER — OFFICE VISIT (OUTPATIENT)
Dept: FAMILY MEDICINE CLINIC | Facility: CLINIC | Age: 19
End: 2024-11-06
Payer: COMMERCIAL

## 2024-11-06 VITALS
HEART RATE: 57 BPM | DIASTOLIC BLOOD PRESSURE: 80 MMHG | OXYGEN SATURATION: 97 % | TEMPERATURE: 98 F | SYSTOLIC BLOOD PRESSURE: 122 MMHG | HEIGHT: 68 IN | BODY MASS INDEX: 20 KG/M2 | RESPIRATION RATE: 16 BRPM | WEIGHT: 132 LBS

## 2024-11-06 DIAGNOSIS — J01.00 ACUTE NON-RECURRENT MAXILLARY SINUSITIS: Primary | ICD-10-CM

## 2024-11-06 PROCEDURE — 99213 OFFICE O/P EST LOW 20 MIN: CPT | Performed by: PHYSICIAN ASSISTANT

## 2024-11-06 RX ORDER — PREDNISONE 20 MG/1
40 TABLET ORAL DAILY
Qty: 10 TABLET | Refills: 0 | Status: SHIPPED | OUTPATIENT
Start: 2024-11-06 | End: 2024-11-11

## 2024-11-06 RX ORDER — CEFUROXIME AXETIL 500 MG/1
500 TABLET ORAL EVERY 12 HOURS SCHEDULED
Qty: 20 TABLET | Refills: 0 | Status: SHIPPED | OUTPATIENT
Start: 2024-11-06 | End: 2024-11-16

## 2024-11-06 NOTE — PROGRESS NOTES
Assessment/Plan:    Sinusitis - ceftin 1 tab twice daily for 10 days, prednisone 40 mg once daily for 5 days, fluids, rest    F/u sa needed      Subjective:   Chief Complaint   Patient presents with    Sore Throat     Pt c/o sore throat, cough, congestion  Symptoms started about a week ago and not improving   Seen in urgent care on 11/3 and negative for strep and covid  Prescribed prednisone and not getting better  Pt had hx of mono      Patient ID: Sanford Nixon is a 19 y.o. male.    Patient went to a concert  10/28 and the next day woke up with a sore throat and congestion. Trouble sleeping because of congestion, a lot of mucus from nose, minimal mucus. Coughing mixed dry and productive, dry at night. Trouble sleeping last night. Denies fever, chills.         The following portions of the patient's history were reviewed and updated as appropriate: allergies, current medications, past family history, past medical history, past social history, past surgical history, and problem list.    Past Medical History:   Diagnosis Date    Anxiety     Depression     PVC's (premature ventricular contractions)      History reviewed. No pertinent surgical history.  Family History   Problem Relation Age of Onset    Alcohol abuse Mother     Substance Abuse Mother     No Known Problems Father     Alcohol abuse Maternal Aunt     Substance Abuse Maternal Aunt     Alcohol abuse Maternal Uncle     Substance Abuse Maternal Uncle     Alcohol abuse Cousin     Substance Abuse Cousin     Mental illness Neg Hx      Social History     Socioeconomic History    Marital status: Single     Spouse name: Not on file    Number of children: Not on file    Years of education: Not on file    Highest education level: Not on file   Occupational History    Not on file   Tobacco Use    Smoking status: Never    Smokeless tobacco: Current    Tobacco comments:     Vaping   Vaping Use    Vaping status: Every Day    Substances: Nicotine, Flavoring  "  Substance and Sexual Activity    Alcohol use: Not Currently     Comment: Rarely    Drug use: Yes     Types: Marijuana     Comment: medical card    Sexual activity: Yes     Partners: Female     Birth control/protection: Condom Male   Other Topics Concern    Not on file   Social History Narrative    Not on file     Social Determinants of Health     Financial Resource Strain: Not on file   Food Insecurity: Not on file   Transportation Needs: Not on file   Physical Activity: Not on file   Stress: Not on file   Social Connections: Not on file   Intimate Partner Violence: Not on file   Housing Stability: Not on file       Current Outpatient Medications:     metoprolol succinate (TOPROL-XL) 25 mg 24 hr tablet, Take 1 tablet (25 mg total) by mouth daily, Disp: 90 tablet, Rfl: 3    predniSONE 10 mg tablet, Take 5 tablets (50 mg total) by mouth daily for 1 day, THEN 4 tablets (40 mg total) daily for 1 day, THEN 3 tablets (30 mg total) daily for 1 day, THEN 2 tablets (20 mg total) daily for 1 day, THEN 1 tablet (10 mg total) daily for 1 day., Disp: 15 tablet, Rfl: 0    valACYclovir (VALTREX) 1,000 mg tablet, Take 2 tablets (2,000 mg total) by mouth 2 (two) times a day for 3 days, Disp: 12 tablet, Rfl: 1    Review of Systems          Objective:    Vitals:    11/06/24 1341   BP: 122/80   Pulse: 57   Resp: 16   Temp: 98 °F (36.7 °C)   TempSrc: Temporal   SpO2: 97%   Weight: 59.9 kg (132 lb)   Height: 5' 8\" (1.727 m)        Physical Exam  Constitutional:       General: He is not in acute distress.     Appearance: Normal appearance. He is well-developed. He is not toxic-appearing.   HENT:      Head: Normocephalic and atraumatic.      Right Ear: Tympanic membrane, ear canal and external ear normal. No middle ear effusion. Tympanic membrane is not erythematous.      Left Ear: Ear canal and external ear normal. A middle ear effusion is present. Tympanic membrane is not erythematous.      Nose: Mucosal edema, congestion and " rhinorrhea present.      Mouth/Throat:      Mouth: Mucous membranes are moist.      Pharynx: Oropharynx is clear. Posterior oropharyngeal erythema present. No oropharyngeal exudate.      Tonsils: Tonsillar exudate present. 1+ on the right. 1+ on the left.   Eyes:      Extraocular Movements: Extraocular movements intact.      Conjunctiva/sclera: Conjunctivae normal.   Cardiovascular:      Rate and Rhythm: Normal rate and regular rhythm.      Pulses: Normal pulses.      Heart sounds: Normal heart sounds.   Pulmonary:      Effort: Pulmonary effort is normal.      Breath sounds: Normal breath sounds.   Musculoskeletal:         General: Normal range of motion.      Cervical back: Normal range of motion and neck supple. No rigidity or tenderness.   Lymphadenopathy:      Cervical: No cervical adenopathy.   Skin:     General: Skin is warm.   Neurological:      General: No focal deficit present.      Mental Status: He is alert and oriented to person, place, and time.   Psychiatric:         Mood and Affect: Mood normal.         Behavior: Behavior normal.         Thought Content: Thought content normal.         Judgment: Judgment normal.

## 2024-11-06 NOTE — LETTER
November 6, 2024     Patient: Sanford Nixon  YOB: 2005  Date of Visit: 11/6/2024      To Whom it May Concern:    Sanford Nixon is under my professional care. Sanford was seen in my office on 11/6/2024. Sanford may return to school on 11/11/2024 .    If you have any questions or concerns, please don't hesitate to call.         Sincerely,          Yulissa Sarah PA-C        CC: No Recipients

## 2024-11-15 ENCOUNTER — TELEPHONE (OUTPATIENT)
Age: 19
End: 2024-11-15

## 2024-11-15 NOTE — TELEPHONE ENCOUNTER
Patient is requesting an insurance referral for the following specialty:      Test Name / Order Name: follow up    DX Code: G47.00    Date Of Service: 11/26/24    Location/Facility Name/Address/Phone #:   [facility] [place],   [address] [place] [address],   [place], [place], [address]    [phone]     Location / Facility NPI: [id number]    Best Phone # To Reach The Patient:  [phone]    AMB referral is already in chart.

## 2024-12-23 ENCOUNTER — TELEPHONE (OUTPATIENT)
Age: 19
End: 2024-12-23

## 2024-12-30 NOTE — TELEPHONE ENCOUNTER
"Behavioral Health Outpatient Intake Questions    Referred By   : Referral     Please advise interviewee that they need to answer all questions truthfully to allow for best care, and any misrepresentations of information may affect their ability to be seen at this clinic   => Was this discussed? Yes     If Minor Child (under age 18)    Who is/are the legal guardian(s) of the child?     Is there a custody agreement? No     If \"YES\"- Custody orders must be obtained prior to scheduling the first appointment  In addition, Consent to Treatment must be signed by all legal guardians prior to scheduling the first appointment    If \"NO\"- Consent to Treatment must be signed by all legal guardians prior to scheduling the first appointment    Behavioral Health Outpatient Intake History -     Presenting Problem (in patient's own words):     Pt stated that he is dealing with a lot of anxiety and depression.     Are there any communication barriers for this patient?     Yes                                               If yes, please describe barriers: ADHD  If there is a unique situation, please refer to Perry Spear/Kaylee Gardner for final determination.    Are you taking any psychiatric medications? No     If \"YES\" -What are they  NONE       If \"YES\" -Who prescribes?     Has the Patient previously received outpatient Talk Therapy or Medication Management from [facility]  No        If \"YES\"- When, Where and with Whom?         If \"NO\" -Has Patient received these services elsewhere?       If \"YES\" -When, Where, and with Whom?    Has the Patient abused alcohol or other substances in the last 6 months ? No  No concerns of substance abuse are reported.     If \"YES\" -What substance, How much, How often?     If illegal substance: Refer to [facility] (for ALISE) or [facility].   If Alcohol in excess of 10 drinks per week:  Refer to [facility] (for ALISE) or [facility]    Legal History-     Is this treatment court ordered? " "No   If \"yes \"send to :  Talk Therapy : Send to Perry Spear for final determination   Med Management: Send to Dr. Carlton for final determination     Has the Patient been convicted of a felony?  No   If \"Yes\" send to -When, What?  Talk Therapy: Send to Perry Spear for final determination   Med Management: Send to Dr. Carlton for final determination     ACCEPTED as a patient Yes  If \"Yes\" Appointment Date: 1/29/2025    Referred Elsewhere? No  If “Yes” - (Where? Ex: [facility], [facility], [facility], [facility], etc.)       Name of Insurance Co:[employer]  Insurance ID#[id number]  Insurance Phone #  If ins is primary or secondary?Primary   If patient is a minor, parents information such as Name, D.O.B of guarantor.  "

## 2025-01-02 DIAGNOSIS — I49.3 PREMATURE VENTRICULAR CONTRACTIONS: ICD-10-CM

## 2025-01-02 NOTE — TELEPHONE ENCOUNTER
Reason for call:   [x] Refill   [] Prior Auth  [] Other:     Office:   [] PCP/Provider -   [x] Specialty/Provider - Cardiology     Medication: Metoprolol Succinate     Dose/Frequency: 25 mg 24 hr tablet taken by mouth once daily     Quantity: 90    Pharmacy: [facility]/pharmacy #[id number] - [place] - [address] [address] [phone]     Does the patient have enough for 3 days?   [] Yes   [x] No - Send as HP to POD

## 2025-01-03 RX ORDER — METOPROLOL SUCCINATE 25 MG/1
25 TABLET, EXTENDED RELEASE ORAL DAILY
Qty: 30 TABLET | Refills: 0 | OUTPATIENT
Start: 2025-01-03

## 2025-01-06 ENCOUNTER — TELEPHONE (OUTPATIENT)
Dept: PSYCHIATRY | Facility: CLINIC | Age: 20
End: 2025-01-06

## 2025-01-06 NOTE — TELEPHONE ENCOUNTER
One week follow up call for New Patient appointment with Gertrude Sales PA-C  on 01/29/2025 was made on 01/06/2025. Writer informed patient of New Patient paperwork needing to be completed 5 days prior to the appointment. Writer confirmed paperwork has been sent via Kite.    Appointment was made on: 12/30/2024

## 2025-01-13 NOTE — TELEPHONE ENCOUNTER
(active communication consent on file)  [phone]  Please call mom for scheduling     Pt needs refill before heading back to school

## 2025-01-24 ENCOUNTER — OFFICE VISIT (OUTPATIENT)
Dept: CARDIOLOGY CLINIC | Facility: CLINIC | Age: 20
End: 2025-01-24
Payer: COMMERCIAL

## 2025-01-24 VITALS
SYSTOLIC BLOOD PRESSURE: 110 MMHG | BODY MASS INDEX: 19.02 KG/M2 | HEART RATE: 56 BPM | OXYGEN SATURATION: 97 % | HEIGHT: 68 IN | DIASTOLIC BLOOD PRESSURE: 74 MMHG | WEIGHT: 125.5 LBS

## 2025-01-24 DIAGNOSIS — I47.20 VENTRICULAR TACHYCARDIA (HCC): ICD-10-CM

## 2025-01-24 DIAGNOSIS — I49.3 PVC'S (PREMATURE VENTRICULAR CONTRACTIONS): ICD-10-CM

## 2025-01-24 DIAGNOSIS — I49.3 PREMATURE VENTRICULAR CONTRACTIONS: Primary | ICD-10-CM

## 2025-01-24 PROCEDURE — 99214 OFFICE O/P EST MOD 30 MIN: CPT

## 2025-01-24 RX ORDER — METOPROLOL SUCCINATE 25 MG/1
25 TABLET, EXTENDED RELEASE ORAL DAILY
Qty: 90 TABLET | Refills: 3 | Status: SHIPPED | OUTPATIENT
Start: 2025-01-24

## 2025-01-24 NOTE — PATIENT INSTRUCTIONS
Go to ER with any sustained elevated heart rates, shortness of breath or chest pain     Toprol from pharmacy
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
## 2025-01-29 ENCOUNTER — TELEMEDICINE (OUTPATIENT)
Dept: PSYCHIATRY | Facility: CLINIC | Age: 20
End: 2025-01-29
Payer: COMMERCIAL

## 2025-01-29 DIAGNOSIS — F41.1 GENERALIZED ANXIETY DISORDER: ICD-10-CM

## 2025-01-29 DIAGNOSIS — F33.1 MAJOR DEPRESSIVE DISORDER, RECURRENT, MODERATE (HCC): Primary | ICD-10-CM

## 2025-01-29 DIAGNOSIS — Z79.899 MEDICAL MARIJUANA USE: ICD-10-CM

## 2025-01-29 DIAGNOSIS — F90.2 ADHD (ATTENTION DEFICIT HYPERACTIVITY DISORDER), COMBINED TYPE: ICD-10-CM

## 2025-01-29 PROBLEM — F33.9 DEPRESSION, RECURRENT (HCC): Status: RESOLVED | Noted: 2019-03-19 | Resolved: 2025-01-29

## 2025-01-29 PROCEDURE — 90792 PSYCH DIAG EVAL W/MED SRVCS: CPT

## 2025-01-29 RX ORDER — ESCITALOPRAM OXALATE 10 MG/1
10 TABLET ORAL DAILY
Qty: 30 TABLET | Refills: 1 | Status: SHIPPED | OUTPATIENT
Start: 2025-01-29

## 2025-01-29 RX ORDER — ESCITALOPRAM OXALATE 5 MG/1
5 TABLET ORAL DAILY
Qty: 7 TABLET | Refills: 0 | Status: SHIPPED | OUTPATIENT
Start: 2025-01-29

## 2025-01-29 NOTE — ASSESSMENT & PLAN NOTE
Variable   Start Lexapro 5 mg daily for 1 week, then increase to 10 mg daily to help with anxiety and depressive symptoms   Recommended outpatient therapy, will place patient on the wait list     Depression Follow-up Plan Completed: Yes    Orders:    escitalopram (LEXAPRO) 5 mg tablet; Take 1 tablet (5 mg total) by mouth daily    escitalopram (Lexapro) 10 mg tablet; Take 1 tablet (10 mg total) by mouth daily

## 2025-01-29 NOTE — BH TREATMENT PLAN
TREATMENT PLAN (Medication Management Only)        [facility]    Name and Date of Birth:  Sanford Nixon 19 y.o. 2005  Date of Treatment Plan: January 29, 2025  Diagnosis/Diagnoses:    1. Major depressive disorder, recurrent, moderate (HCC)    2. Generalized anxiety disorder    3. ADHD (attention deficit hyperactivity disorder), combined type      Strengths/Personal Resources for Self-Care: supportive family, supportive friends, taking medications as prescribed, ability to adapt to life changes.  Area/Areas of need (in own words): anxiety, depression  1. Long Term Goal: improve mood, maintain an acceptable level of anxiety   Target Date:6 months - 7/29/2025  Person/Persons responsible for completion of goal: Sanford  2.  Short Term Objective (s) - How will we reach this goal?:   A. Provider new recommended medication/dosage changes and/or continue medication(s): continue current medications as prescribed.  B. Attend medication management appointments regularly.  C. N/A.  Target Date:6 months - 7/29/2025  Person/Persons Responsible for Completion of Goal: Sanford  Progress Towards Goals: starting treatment  Treatment Modality: medication management every 1 months  Review due 180 days from date of this plan: 6 months - 7/29/2025  Expected length of service: ongoing treatment  My Physician/PA/NP and I have developed this plan together and I agree to work on the goals and objectives. I understand the treatment goals that were developed for my treatment.

## 2025-01-29 NOTE — BH CRISIS PLAN
Client Name: Bharat Nixon       Client YOB: 2005    WilyJosh Safety Plan      Creation Date: 1/29/25 Update Date: 1/29/26   Created By: Gertrude Sales PA-C Last Updated By: Gertrude Sales PA-C      Step 1: Warning Signs:   Warning Signs   Isolation   Mood swings   Increased anger / lasting longer than usual            Step 2: Internal Coping Strategies:   Internal Coping Strategies   working out   watching tv   reading            Step 3: People and social settings that provide distraction:   Name Contact Information   Brother (Blayne) number in cell phone   Friend (Yung) number in cell phone            Step 4: People whom I can ask for help during a crisis:      Name Contact Information    Brother (Blayne) number in cell phone    Friend (Yung) number in cell phone      Step 5: Professionals or agencies I can contact during a crisis:      Clinican/Agency Name Phone Emergency Contact    Gertrude Sales PA-C ([facility]) [phone]       Local Emergency Department Emergency Department Phone Emergency Department Address    911          Crisis Phone Numbers:   Suicide Prevention Lifeline: Call or Text  700 Crisis Text Line: Text HOME to 421-958   Please note: Some [facility] do not have a separate number for Child/Adolescent specific crisis. If your county is not listed under Child/Adolescent, please call the adult number for your county      Adult Crisis Numbers: Child/Adolescent Crisis Numbers   [facility]: [phone] [facility]: [phone]   [facility]: [phone] [facility]: [phone]   [facility]: [phone] [place]: [phone]   [facility]: [phone] [place]: [phone]   [place] [facility]: [phone]   [facility]: [phone]   [facility]: [phone]   [facility]: [phone] (daytime) [phone] (after hours, weekends, holidays)      Step 6: Making the  environment safer (plan for lethal means safety):   Plan: No firearms in the house     Optional: What is most important to me and worth living for?      Reggie Safety Plan. Melinda Julien and Meir Moreno. Used with permission of the authors.

## 2025-01-29 NOTE — ASSESSMENT & PLAN NOTE
Variable   Start Lexapro 5 mg daily for 1 week, then increase to 10 mg daily to help with anxiety and depressive symptoms   Recommended outpatient therapy, will place patient on the wait list     Orders:    escitalopram (LEXAPRO) 5 mg tablet; Take 1 tablet (5 mg total) by mouth daily    escitalopram (Lexapro) 10 mg tablet; Take 1 tablet (10 mg total) by mouth daily

## 2025-02-20 DIAGNOSIS — F33.1 MAJOR DEPRESSIVE DISORDER, RECURRENT, MODERATE (HCC): ICD-10-CM

## 2025-02-20 DIAGNOSIS — F41.1 GENERALIZED ANXIETY DISORDER: ICD-10-CM

## 2025-02-21 RX ORDER — ESCITALOPRAM OXALATE 10 MG/1
10 TABLET ORAL DAILY
Qty: 90 TABLET | Refills: 1 | OUTPATIENT
Start: 2025-02-21

## 2025-04-26 ENCOUNTER — TELEPHONE (OUTPATIENT)
Dept: PSYCHIATRY | Facility: CLINIC | Age: 20
End: 2025-04-26

## 2025-04-26 NOTE — TELEPHONE ENCOUNTER
Attempted to call client twice.  Phone rings, picks up and then disconnects.  Will attempt again at a later date.  Clt on wait list for Np therapy.  Referral from psychiatry.